# Patient Record
Sex: FEMALE | Race: WHITE | Employment: OTHER | URBAN - METROPOLITAN AREA
[De-identification: names, ages, dates, MRNs, and addresses within clinical notes are randomized per-mention and may not be internally consistent; named-entity substitution may affect disease eponyms.]

---

## 2017-01-27 ENCOUNTER — OFFICE VISIT (OUTPATIENT)
Dept: INFECTIOUS DISEASES | Facility: CLINIC | Age: 57
End: 2017-01-27
Attending: INTERNAL MEDICINE
Payer: COMMERCIAL

## 2017-01-27 ENCOUNTER — DOCUMENTATION ONLY (OUTPATIENT)
Dept: INFECTIOUS DISEASES | Facility: CLINIC | Age: 57
End: 2017-01-27

## 2017-01-27 VITALS
HEART RATE: 119 BPM | WEIGHT: 120.6 LBS | SYSTOLIC BLOOD PRESSURE: 115 MMHG | DIASTOLIC BLOOD PRESSURE: 82 MMHG | OXYGEN SATURATION: 99 % | BODY MASS INDEX: 18.88 KG/M2 | TEMPERATURE: 97.8 F

## 2017-01-27 DIAGNOSIS — F99 PSYCHIATRIC DISORDER: ICD-10-CM

## 2017-01-27 DIAGNOSIS — K92.1 MELENA: Primary | ICD-10-CM

## 2017-01-27 PROCEDURE — 99212 OFFICE O/P EST SF 10 MIN: CPT | Mod: ZF

## 2017-01-27 ASSESSMENT — PAIN SCALES - GENERAL: PAINLEVEL: EXTREME PAIN (9)

## 2017-01-27 NOTE — NURSING NOTE
"Chief Complaint   Patient presents with     RECHECK     follow up for TB of Middle Ear       Initial /82 mmHg  Pulse 119  Temp(Src) 97.8  F (36.6  C) (Oral)  Wt 54.704 kg (120 lb 9.6 oz)  SpO2 99% Estimated body mass index is 18.88 kg/(m^2) as calculated from the following:    Height as of 11/8/16: 1.702 m (5' 7\").    Weight as of this encounter: 54.704 kg (120 lb 9.6 oz).  BP completed using cuff size: rebecca BLANCO CMA    "

## 2017-01-27 NOTE — PROGRESS NOTES
Rapid Response Epic Documentation     Situation: Pt in waiting room experiencing shortness of breath.      Objective: Pt being seen in infectious disease for possible paracite infection.  Pt reports very nervous, concerned about getting the care she wants.  Also reports nausea and dizziness, these are normal symptoms for her.  Denies chest pain or pain elsewhere.      Assessment: Vitals at 1310 /92 P 117-121, O2 sats 99% on RA.  1315 vitals: /100, P 114 O2 sats 100% on 2 LPM oxygen.  1320 vitals: /89, P 113, O2 sats 100% on 2 LPM oxygen.      Plan: After applying oxygen and compassionate listening, pt reported improvement in her symptoms.  Writer asked if pt would like to be evaluated at the ER for her shortness of breath, she declined.      Location: Transferred pt to exam room to await MD arrival.  Pt was able to ambulate independently. Upon arrival to the exam room, pt reported that she hadn't eaten much, she has a poor appetite.  Clinic staff provided Ensure beverage to pt.      Disposition:      Discussed with Dr. Anila Braga, provider seeing pt today.  She felt comfortable with letting the pt stay for the appt.  Pt remained on oxygen when RRT departed. Pt in stable condition.     Protocol Used:     none

## 2017-01-27 NOTE — Clinical Note
1/27/2017       RE: Breana Donahue  [id number] [facility] [id number]     Dear Colleague,    Thank you for referring your patient, Breana Donahue, to the [facility] AND INFECTIOUS DISEASES at [facility]. Please see a copy of my visit note below.    ID Clinic Follow-up Note    Reason for visit: follow up of chronic otitis media that is culture-positive for TIFFANIE    Impression:  1. Self-induced TIFFANIE otitis media (vs. TIFFANIE colonization) that has occurred due to attempted self-remedy of delusional parasitosis.   Fortunately her mucopurulent ear drainage has resolved since her last visit, but it's unclear the extent to which antimicrobials are responsible for this. I actually doubt that she took the clarithro/cipro regimen, so suspect the ear drainage was self-resolving or has been remedied with saline rinses.  Plan:  -no further therapy  -return to ID clinic if drainage returns    2. H pylori gastritis with ulceration and report of ongoing melena. She is refusing despite my encouragement to take 4-drug therapy as directed by GI because she fears worsening parasitosis. I encouraged her to undergo testing including CBC, INR and PTT but she refused.  Plan:  -continue to recommend 3 drug therapy, unfortunately patient is refusin    3. 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 I reiterated that she has no evidence of parasitic disease. She inquired about send-out testing to a [place], [place] lab and I told her the results would not  at this time.  Plan:  -no antiparasitics indicated; she could use psychiatric input but refuses.    Visit length 25min, >50% clinical counseling/care coordination.    Anila Steiner MD   of Medicine, Division of Infectious Diseases  [facility] [phone]      History of Present Illness:  Breana is following up after I saw her last in 10/2016. She has  delusional parasitosis and a history of utilizing vinegar on her corneas (resulting in significant damage) along with placement of organic matter (plantains) in her ears due to her belief that these substances will aid in the eradication of parasites from her body. She is followed by Dr. Hernandez in ENT for several months of R ear drainage that is related to chronic R otitis media. She underwent a partial mastoidectomy. Cultures from this procedure revealed TIFFANIE, though notably pathology was negative for AFB. MRI of the maxillofacial structures in ~9/2016 revealed post-surgical changes of mastoidectomy with no intra-cranial extension. The sensitivities of her TIFFANIE reveal S- to quinolones and clarithromycin    I prescribed oral clarithromycin and otic cipro at her last visit. She initially told me she was using these medications, but later said she had filled the prescriptions but had stopped them months ago. She is doing frequent saline rinses of her bilateral ears.    In the interim, she underwent evaluation for melena and anemia that revealed a bleeding upper GI ulcer that pathologically suggested H pylori infection. She was recommended 3-drug therapy for this but per Dr. Zelaya in GI she refused. She reports ongoing melena that she attributes to parasitosis.    She continues to have sensations of parasites crawling in her body and through her skin and other orifices. She also feels as though parasites are responsible for her ear drainage and for several skin lesions located on her scalp and her arms. She is very firm in this belief and perceives that she poses an infectious risk to others.    Past Medical History   Diagnosis Date     Blind left eye      Delusions of parasitosis      Social History     Social History     Marital Status: Single     Spouse Name: N/A     Number of Children: N/A     Years of Education: N/A     Occupational History     Not on file.     Social History Main Topics     Smoking status:  Current Some Day Smoker -- 0.10 packs/day     Types: Cigarettes     Smokeless tobacco: Never Used      Comment: states no longer smoking 8/16/16     Alcohol Use: No     Drug Use: No     Sexual Activity:     Partners: Male     Other Topics Concern     Parent/Sibling W/ Cabg, Mi Or Angioplasty Before 65f 55m? No     Social History Narrative     Family History   Problem Relation Age of Onset     Alzheimer Disease Mother      Substance Abuse Mother      Alzheimer Disease Father      Substance Abuse Father      Skin Cancer No family hx of      Glaucoma No family hx of      Macular Degeneration No family hx of      Ovarian Cancer Mother      /82 mmHg  Pulse 119  Temp(Src) 97.8  F (36.6  C) (Oral)  Wt 54.704 kg (120 lb 9.6 oz)  SpO2 99%   Notably, while in the WR, she complained of difficulty breathing. Her vitals revealed normal pulse oximetry. Her SOBr resolved during our encounter  Awake, alert, generally pleasant, no distress  Examination of her R ear canal reveals no drainage.  She perseverates about parasitic infestation but is linear in her thinking and does not respond to internal stimuli    Again, thank you for allowing me to participate in the care of your patient.      Sincerely,    Anila Steiner MD

## 2017-12-30 ENCOUNTER — HEALTH MAINTENANCE LETTER (OUTPATIENT)
Age: 57
End: 2017-12-30

## 2018-08-29 ENCOUNTER — RECORDS - HEALTHEAST (OUTPATIENT)
Dept: LAB | Facility: CLINIC | Age: 58
End: 2018-08-29

## 2018-08-31 LAB — B BURGDOR IGG+IGM SER QL: 0.08 INDEX VALUE

## 2018-11-09 ENCOUNTER — TRANSFERRED RECORDS (OUTPATIENT)
Dept: HEALTH INFORMATION MANAGEMENT | Facility: CLINIC | Age: 58
End: 2018-11-09

## 2018-12-13 ENCOUNTER — HOSPITAL ENCOUNTER (OUTPATIENT)
Facility: CLINIC | Age: 58
End: 2018-12-13
Attending: OTOLARYNGOLOGY | Admitting: OTOLARYNGOLOGY
Payer: MEDICAID

## 2019-01-31 RX ORDER — CEFAZOLIN SODIUM 1 G/3ML
1 INJECTION, POWDER, FOR SOLUTION INTRAMUSCULAR; INTRAVENOUS SEE ADMIN INSTRUCTIONS
Status: CANCELLED | OUTPATIENT
Start: 2019-01-31

## 2019-01-31 RX ORDER — CEFAZOLIN SODIUM 2 G/100ML
2 INJECTION, SOLUTION INTRAVENOUS
Status: CANCELLED | OUTPATIENT
Start: 2019-01-31

## 2019-02-03 ENCOUNTER — ANESTHESIA EVENT (OUTPATIENT)
Dept: SURGERY | Facility: CLINIC | Age: 59
End: 2019-02-03

## 2019-02-04 ENCOUNTER — ANESTHESIA (OUTPATIENT)
Dept: SURGERY | Facility: CLINIC | Age: 59
End: 2019-02-04

## 2019-02-15 ENCOUNTER — TRANSFERRED RECORDS (OUTPATIENT)
Dept: HEALTH INFORMATION MANAGEMENT | Facility: CLINIC | Age: 59
End: 2019-02-15

## 2019-03-11 ENCOUNTER — ANESTHESIA (OUTPATIENT)
Dept: SURGERY | Facility: CLINIC | Age: 59
End: 2019-03-11
Payer: MEDICAID

## 2019-03-11 ENCOUNTER — ANESTHESIA EVENT (OUTPATIENT)
Dept: SURGERY | Facility: CLINIC | Age: 59
End: 2019-03-11
Payer: MEDICAID

## 2019-03-11 ENCOUNTER — HOSPITAL ENCOUNTER (OUTPATIENT)
Facility: CLINIC | Age: 59
Discharge: HOME | End: 2019-03-11
Attending: OTOLARYNGOLOGY | Admitting: OTOLARYNGOLOGY
Payer: MEDICAID

## 2019-03-11 VITALS
TEMPERATURE: 98.1 F | RESPIRATION RATE: 12 BRPM | BODY MASS INDEX: 18.67 KG/M2 | HEIGHT: 66 IN | SYSTOLIC BLOOD PRESSURE: 114 MMHG | OXYGEN SATURATION: 94 % | DIASTOLIC BLOOD PRESSURE: 75 MMHG | WEIGHT: 116.18 LBS | HEART RATE: 78 BPM

## 2019-03-11 DIAGNOSIS — H66.92 CHRONIC OTITIS MEDIA OF LEFT EAR: Primary | ICD-10-CM

## 2019-03-11 LAB — GLUCOSE BLDC GLUCOMTR-MCNC: 94 MG/DL (ref 70–99)

## 2019-03-11 PROCEDURE — 37000009 ZZH ANESTHESIA TECHNICAL FEE, EACH ADDTL 15 MIN: Performed by: OTOLARYNGOLOGY

## 2019-03-11 PROCEDURE — 88304 TISSUE EXAM BY PATHOLOGIST: CPT | Mod: 26 | Performed by: OTOLARYNGOLOGY

## 2019-03-11 PROCEDURE — 82962 GLUCOSE BLOOD TEST: CPT

## 2019-03-11 PROCEDURE — 40000170 ZZH STATISTIC PRE-PROCEDURE ASSESSMENT II: Performed by: OTOLARYNGOLOGY

## 2019-03-11 PROCEDURE — 37000008 ZZH ANESTHESIA TECHNICAL FEE, 1ST 30 MIN: Performed by: OTOLARYNGOLOGY

## 2019-03-11 PROCEDURE — 27210794 ZZH OR GENERAL SUPPLY STERILE: Performed by: OTOLARYNGOLOGY

## 2019-03-11 PROCEDURE — 25000132 ZZH RX MED GY IP 250 OP 250 PS 637: Performed by: OTOLARYNGOLOGY

## 2019-03-11 PROCEDURE — 88304 TISSUE EXAM BY PATHOLOGIST: CPT | Performed by: OTOLARYNGOLOGY

## 2019-03-11 PROCEDURE — 25000566 ZZH SEVOFLURANE, EA 15 MIN: Performed by: OTOLARYNGOLOGY

## 2019-03-11 PROCEDURE — 25800030 ZZH RX IP 258 OP 636: Performed by: NURSE ANESTHETIST, CERTIFIED REGISTERED

## 2019-03-11 PROCEDURE — 36000062 ZZH SURGERY LEVEL 4 1ST 30 MIN - UMMC: Performed by: OTOLARYNGOLOGY

## 2019-03-11 PROCEDURE — 25000128 H RX IP 250 OP 636: Performed by: OTOLARYNGOLOGY

## 2019-03-11 PROCEDURE — 71000015 ZZH RECOVERY PHASE 1 LEVEL 2 EA ADDTL HR: Performed by: OTOLARYNGOLOGY

## 2019-03-11 PROCEDURE — 71000014 ZZH RECOVERY PHASE 1 LEVEL 2 FIRST HR: Performed by: OTOLARYNGOLOGY

## 2019-03-11 PROCEDURE — 71000027 ZZH RECOVERY PHASE 2 EACH 15 MINS: Performed by: OTOLARYNGOLOGY

## 2019-03-11 PROCEDURE — 25000128 H RX IP 250 OP 636: Performed by: NURSE ANESTHETIST, CERTIFIED REGISTERED

## 2019-03-11 PROCEDURE — 36000064 ZZH SURGERY LEVEL 4 EA 15 ADDTL MIN - UMMC: Performed by: OTOLARYNGOLOGY

## 2019-03-11 PROCEDURE — 25000128 H RX IP 250 OP 636: Performed by: ANESTHESIOLOGY

## 2019-03-11 PROCEDURE — 25000125 ZZHC RX 250: Performed by: OTOLARYNGOLOGY

## 2019-03-11 PROCEDURE — 25000125 ZZHC RX 250: Performed by: NURSE ANESTHETIST, CERTIFIED REGISTERED

## 2019-03-11 DEVICE — SHEET SILICONE 38MMX51MMX0.13MM  20-10685: Type: IMPLANTABLE DEVICE | Site: EAR | Status: FUNCTIONAL

## 2019-03-11 RX ORDER — SODIUM CHLORIDE, SODIUM LACTATE, POTASSIUM CHLORIDE, CALCIUM CHLORIDE 600; 310; 30; 20 MG/100ML; MG/100ML; MG/100ML; MG/100ML
INJECTION, SOLUTION INTRAVENOUS CONTINUOUS
Status: DISCONTINUED | OUTPATIENT
Start: 2019-03-11 | End: 2019-03-18 | Stop reason: HOSPADM

## 2019-03-11 RX ORDER — LIDOCAINE HYDROCHLORIDE AND EPINEPHRINE 10; 10 MG/ML; UG/ML
INJECTION, SOLUTION INFILTRATION; PERINEURAL PRN
Status: DISCONTINUED | OUTPATIENT
Start: 2019-03-11 | End: 2019-03-18 | Stop reason: HOSPADM

## 2019-03-11 RX ORDER — ONDANSETRON 2 MG/ML
4 INJECTION INTRAMUSCULAR; INTRAVENOUS EVERY 30 MIN PRN
Status: DISCONTINUED | OUTPATIENT
Start: 2019-03-11 | End: 2019-03-18 | Stop reason: HOSPADM

## 2019-03-11 RX ORDER — BACITRACIN ZINC 500 [USP'U]/G
OINTMENT TOPICAL PRN
Status: DISCONTINUED | OUTPATIENT
Start: 2019-03-11 | End: 2019-03-18 | Stop reason: HOSPADM

## 2019-03-11 RX ORDER — OXYCODONE HYDROCHLORIDE 5 MG/1
5 TABLET ORAL EVERY 4 HOURS PRN
Qty: 16 TABLET | Refills: 0 | Status: SHIPPED | OUTPATIENT
Start: 2019-03-11

## 2019-03-11 RX ORDER — FENTANYL CITRATE 50 UG/ML
25-50 INJECTION, SOLUTION INTRAMUSCULAR; INTRAVENOUS
Status: DISCONTINUED | OUTPATIENT
Start: 2019-03-11 | End: 2019-03-18 | Stop reason: HOSPADM

## 2019-03-11 RX ORDER — MEPERIDINE HYDROCHLORIDE 25 MG/ML
12.5 INJECTION INTRAMUSCULAR; INTRAVENOUS; SUBCUTANEOUS
Status: DISCONTINUED | OUTPATIENT
Start: 2019-03-11 | End: 2019-03-18 | Stop reason: HOSPADM

## 2019-03-11 RX ORDER — PROPOFOL 10 MG/ML
INJECTION, EMULSION INTRAVENOUS PRN
Status: DISCONTINUED | OUTPATIENT
Start: 2019-03-11 | End: 2019-03-11

## 2019-03-11 RX ORDER — ONDANSETRON 4 MG/1
4 TABLET, ORALLY DISINTEGRATING ORAL EVERY 30 MIN PRN
Status: DISCONTINUED | OUTPATIENT
Start: 2019-03-11 | End: 2019-03-18 | Stop reason: HOSPADM

## 2019-03-11 RX ORDER — EPINEPHRINE NASAL SOLUTION 1 MG/ML
SOLUTION NASAL PRN
Status: DISCONTINUED | OUTPATIENT
Start: 2019-03-11 | End: 2019-03-18 | Stop reason: HOSPADM

## 2019-03-11 RX ORDER — NALOXONE HYDROCHLORIDE 0.4 MG/ML
.1-.4 INJECTION, SOLUTION INTRAMUSCULAR; INTRAVENOUS; SUBCUTANEOUS
Status: ACTIVE | OUTPATIENT
Start: 2019-03-11 | End: 2019-03-12

## 2019-03-11 RX ORDER — OXYCODONE HYDROCHLORIDE 5 MG/1
5 TABLET ORAL
Status: COMPLETED | OUTPATIENT
Start: 2019-03-11 | End: 2019-03-11

## 2019-03-11 RX ORDER — HYDROMORPHONE HYDROCHLORIDE 1 MG/ML
.3-.5 INJECTION, SOLUTION INTRAMUSCULAR; INTRAVENOUS; SUBCUTANEOUS EVERY 10 MIN PRN
Status: DISCONTINUED | OUTPATIENT
Start: 2019-03-11 | End: 2019-03-18 | Stop reason: HOSPADM

## 2019-03-11 RX ORDER — AMOXICILLIN 500 MG/1
500 CAPSULE ORAL 2 TIMES DAILY
Qty: 20 CAPSULE | Refills: 0 | Status: SHIPPED | OUTPATIENT
Start: 2019-03-11 | End: 2019-03-21

## 2019-03-11 RX ORDER — CEFAZOLIN SODIUM 1 G/3ML
1 INJECTION, POWDER, FOR SOLUTION INTRAMUSCULAR; INTRAVENOUS SEE ADMIN INSTRUCTIONS
Status: DISCONTINUED | OUTPATIENT
Start: 2019-03-11 | End: 2019-03-11 | Stop reason: HOSPADM

## 2019-03-11 RX ORDER — ONDANSETRON 2 MG/ML
INJECTION INTRAMUSCULAR; INTRAVENOUS PRN
Status: DISCONTINUED | OUTPATIENT
Start: 2019-03-11 | End: 2019-03-11

## 2019-03-11 RX ORDER — HYDRALAZINE HYDROCHLORIDE 20 MG/ML
2.5-5 INJECTION INTRAMUSCULAR; INTRAVENOUS EVERY 10 MIN PRN
Status: DISCONTINUED | OUTPATIENT
Start: 2019-03-11 | End: 2019-03-18 | Stop reason: HOSPADM

## 2019-03-11 RX ORDER — SULFACETAMIDE SODIUM AND PREDNISOLONE SODIUM PHOSPHATE 100; 2.3 MG/ML; MG/ML
SOLUTION/ DROPS OPHTHALMIC PRN
Status: DISCONTINUED | OUTPATIENT
Start: 2019-03-11 | End: 2019-03-18 | Stop reason: HOSPADM

## 2019-03-11 RX ORDER — LIDOCAINE HYDROCHLORIDE 20 MG/ML
INJECTION, SOLUTION INFILTRATION; PERINEURAL PRN
Status: DISCONTINUED | OUTPATIENT
Start: 2019-03-11 | End: 2019-03-11

## 2019-03-11 RX ORDER — SODIUM CHLORIDE, SODIUM LACTATE, POTASSIUM CHLORIDE, CALCIUM CHLORIDE 600; 310; 30; 20 MG/100ML; MG/100ML; MG/100ML; MG/100ML
INJECTION, SOLUTION INTRAVENOUS CONTINUOUS PRN
Status: DISCONTINUED | OUTPATIENT
Start: 2019-03-11 | End: 2019-03-11

## 2019-03-11 RX ORDER — FENTANYL CITRATE 50 UG/ML
INJECTION, SOLUTION INTRAMUSCULAR; INTRAVENOUS PRN
Status: DISCONTINUED | OUTPATIENT
Start: 2019-03-11 | End: 2019-03-11

## 2019-03-11 RX ORDER — CEFAZOLIN SODIUM 2 G/100ML
2 INJECTION, SOLUTION INTRAVENOUS
Status: COMPLETED | OUTPATIENT
Start: 2019-03-11 | End: 2019-03-11

## 2019-03-11 RX ORDER — DEXAMETHASONE SODIUM PHOSPHATE 4 MG/ML
INJECTION, SOLUTION INTRA-ARTICULAR; INTRALESIONAL; INTRAMUSCULAR; INTRAVENOUS; SOFT TISSUE PRN
Status: DISCONTINUED | OUTPATIENT
Start: 2019-03-11 | End: 2019-03-11

## 2019-03-11 RX ADMIN — SODIUM CHLORIDE, POTASSIUM CHLORIDE, SODIUM LACTATE AND CALCIUM CHLORIDE: 600; 310; 30; 20 INJECTION, SOLUTION INTRAVENOUS at 08:19

## 2019-03-11 RX ADMIN — PROPOFOL 110 MG: 10 INJECTION, EMULSION INTRAVENOUS at 08:28

## 2019-03-11 RX ADMIN — ONDANSETRON 4 MG: 2 INJECTION INTRAMUSCULAR; INTRAVENOUS at 13:48

## 2019-03-11 RX ADMIN — SODIUM CHLORIDE, POTASSIUM CHLORIDE, SODIUM LACTATE AND CALCIUM CHLORIDE: 600; 310; 30; 20 INJECTION, SOLUTION INTRAVENOUS at 10:35

## 2019-03-11 RX ADMIN — PHENYLEPHRINE HYDROCHLORIDE 100 MCG: 10 INJECTION, SOLUTION INTRAMUSCULAR; INTRAVENOUS; SUBCUTANEOUS at 08:46

## 2019-03-11 RX ADMIN — HYDROMORPHONE HYDROCHLORIDE 0.5 MG: 1 INJECTION, SOLUTION INTRAMUSCULAR; INTRAVENOUS; SUBCUTANEOUS at 09:13

## 2019-03-11 RX ADMIN — CEFAZOLIN SODIUM 2 G: 2 INJECTION, SOLUTION INTRAVENOUS at 08:37

## 2019-03-11 RX ADMIN — PHENYLEPHRINE HYDROCHLORIDE 100 MCG: 10 INJECTION, SOLUTION INTRAMUSCULAR; INTRAVENOUS; SUBCUTANEOUS at 09:22

## 2019-03-11 RX ADMIN — CEFAZOLIN SODIUM 1 G: 2 INJECTION, SOLUTION INTRAVENOUS at 10:35

## 2019-03-11 RX ADMIN — MIDAZOLAM 2 MG: 1 INJECTION INTRAMUSCULAR; INTRAVENOUS at 08:19

## 2019-03-11 RX ADMIN — OXYCODONE HYDROCHLORIDE 5 MG: 5 TABLET ORAL at 16:04

## 2019-03-11 RX ADMIN — PROCHLORPERAZINE EDISYLATE 5 MG: 5 INJECTION INTRAMUSCULAR; INTRAVENOUS at 15:50

## 2019-03-11 RX ADMIN — Medication 0.3 MG: at 13:57

## 2019-03-11 RX ADMIN — ONDANSETRON 4 MG: 2 INJECTION INTRAMUSCULAR; INTRAVENOUS at 11:09

## 2019-03-11 RX ADMIN — PHENYLEPHRINE HYDROCHLORIDE 0.3 MCG/KG/MIN: 10 INJECTION, SOLUTION INTRAMUSCULAR; INTRAVENOUS; SUBCUTANEOUS at 09:26

## 2019-03-11 RX ADMIN — PHENYLEPHRINE HYDROCHLORIDE 100 MCG: 10 INJECTION, SOLUTION INTRAMUSCULAR; INTRAVENOUS; SUBCUTANEOUS at 08:41

## 2019-03-11 RX ADMIN — SUGAMMADEX 110 MG: 100 INJECTION, SOLUTION INTRAVENOUS at 11:43

## 2019-03-11 RX ADMIN — ROCURONIUM BROMIDE 20 MG: 10 INJECTION INTRAVENOUS at 08:37

## 2019-03-11 RX ADMIN — FENTANYL CITRATE 12.5 MCG: 50 INJECTION, SOLUTION INTRAMUSCULAR; INTRAVENOUS at 12:43

## 2019-03-11 RX ADMIN — HYDROMORPHONE HYDROCHLORIDE 0.5 MG: 1 INJECTION, SOLUTION INTRAMUSCULAR; INTRAVENOUS; SUBCUTANEOUS at 10:09

## 2019-03-11 RX ADMIN — LIDOCAINE HYDROCHLORIDE 60 MG: 20 INJECTION, SOLUTION INFILTRATION; PERINEURAL at 08:28

## 2019-03-11 RX ADMIN — PHENYLEPHRINE HYDROCHLORIDE 100 MCG: 10 INJECTION, SOLUTION INTRAMUSCULAR; INTRAVENOUS; SUBCUTANEOUS at 08:36

## 2019-03-11 RX ADMIN — ROCURONIUM BROMIDE 30 MG: 10 INJECTION INTRAVENOUS at 08:28

## 2019-03-11 RX ADMIN — FENTANYL CITRATE 100 MCG: 50 INJECTION, SOLUTION INTRAMUSCULAR; INTRAVENOUS at 08:28

## 2019-03-11 RX ADMIN — DEXAMETHASONE SODIUM PHOSPHATE 8 MG: 4 INJECTION, SOLUTION INTRAMUSCULAR; INTRAVENOUS at 08:45

## 2019-03-11 RX ADMIN — PHENYLEPHRINE HYDROCHLORIDE 100 MCG: 10 INJECTION, SOLUTION INTRAMUSCULAR; INTRAVENOUS; SUBCUTANEOUS at 08:55

## 2019-03-11 ASSESSMENT — MIFFLIN-ST. JEOR: SCORE: 1108.75

## 2019-03-11 ASSESSMENT — LIFESTYLE VARIABLES: TOBACCO_USE: 1

## 2019-03-11 NOTE — ANESTHESIA POSTPROCEDURE EVALUATION
Anesthesia POST Procedure Evaluation    Patient: Breana Donahue   MRN:     [id number] Gender:   female   Age:    59 year old :      1960        Preoperative Diagnosis: Mixed Conductive Hearing Loss Bilateral   Procedure(s):  Left Mini Endaural Tympanoplasty, Middle Ear Reconstruction, Round Window Graft, Right Myringotomy #2-2000 Tube  Myringotomy, insert tube, combined   Postop Comments: No value filed.       Anesthesia Type:  General    Reportable Event: NO     PAIN: Uncomplicated   Sign Out status: Comfortable, Well controlled pain     PONV: No PONV   Sign Out status:  No Nausea or Vomiting     Neuro/Psych: Uneventful perioperative course   Sign Out Status: Preoperative baseline; Age appropriate mentation     Airway/Resp.: Uneventful perioperative course   Sign Out Status: Non labored breathing, age appropriate RR; Resp. Status within EXPECTED Parameters     CV: Uneventful perioperative course   Sign Out status: Appropriate BP and perfusion indices; Appropriate HR/Rhythm     Disposition:   Sign Out in:  PACU  Disposition:  Phase II; Home  Recovery Course: Uneventful  Follow-Up: Not required           Last Anesthesia Record Vitals:  CRNA VITALS  3/11/2019 1127 - 3/11/2019 1227      3/11/2019             Pulse:  102    Ht Rate:  102    SpO2:  100 %    Resp Rate (observed):  3  (Abnormal)           Last PACU/Preop Vitals:  Vitals:    19 1300 19 1315 19 1321   BP: 114/63  108/65   Pulse: 82     Resp: 11 (!) 6 8   Temp:   37.1  C (98.7  F)   SpO2: 97% 97% 97%         Electronically Signed By: Christopher Minor MD, 2019, 1:30 PM

## 2019-03-11 NOTE — DISCHARGE INSTRUCTIONS
Same-Day Surgery   Adult Discharge Orders & Instructions     For 24 hours after surgery:  1. Get plenty of rest.  A responsible adult must stay with you for at least 24 hours after you leave the hospital.   2. Pain medication can slow your reflexes. Do not drive or use heavy equipment.  If you have weakness or tingling, don't drive or use heavy equipment until this feeling goes away.  3. Mixing alcohol and pain medication can cause dizziness and slow your breathing. It can even be fatal. Do not drink alcohol while taking pain medication.  4. Avoid strenuous or risky activities.  Ask for help when climbing stairs.   5. You may feel lightheaded.  If so, sit for a few minutes before standing.  Have someone help you get up.   6. If you have nausea (feel sick to your stomach), drink only clear liquids such as apple juice, ginger ale, broth or 7-Up.  Rest may also help.  Be sure to drink enough fluids.  Move to a regular diet as you feel able. Take pain medications with a small amount of solid food, such as toast or crackers, to avoid nausea.   7. A slight fever is normal. Call the doctor if your fever is over 100 F (37.7 C) (taken under the tongue) or lasts longer than 24 hours.  8. You may have a dry mouth, muscle aches, trouble sleeping or a sore throat.  These symptoms should go away after 24 hours.  9. Do not make important or legal decisions.   Pain Management:      1. Take pain medication (if prescribed) for pain as directed by your physician.        2. WARNING: If the pain medication you have been prescribed contains Tylenol  (acetaminophen), DO NOT take additional doses of Tylenol (acetaminophen).     Call your doctor for any of the followin.  Signs of infection (fever, growing tenderness at the surgery site, severe pain, a large amount of drainage or bleeding, foul-smelling drainage, redness, swelling).    2.  It has been over 8 to 10 hours since surgery and you are still not able to urinate (pee).    3.   Headache for over 24 hours.    4.  Numbness, tingling or weakness the day after surgery (if you had spinal anesthesia).  To contact a doctor, call _____________________________________ or:      [phone] and ask for the Resident On Call for:          __________________________________________ (answered 24 hours a day)      Emergency Department:  [place] [facility]: [phone]  [place] [facility]: [phone]               Rev. 10/2014        INFORMATION FOR PATIENTS WHO HAVE HAD EAR SURGERY  DO NOT ALLOW WATER OR MOISTURE IN OR AROUND THE EAR CANAL    OR INCISION  DO NOT WASH YOUR HAIR UNTIL AFTER YOUR FIRST VISIT FOLLOWING SURGERY unless your doctor gives you approval. Precautions must be taken to avoid any kind of water or moisture in your ear incision.   DO NOT BLOW YOUR NOSE. Avoid sneezing, if unavoidable, sneeze with your  mouth open.  SLEEP WITH YOUR HEAD PROPPED UP on two pillows for the first few nights after surgery or until the sutures are removed. This will help reduce swelling and promote drainage. Good sleep also promotes rapid healing.  Firm pillows give the best comfort for sleeping. Some find non-rocking, overstuffed chairs provide comfort. As long as your head is above your feet, a comfortable chair can be used for sleeping.  ACTIVITY:     DO NOT LIFT ANYTHING heavier than 5-10 pounds for 1 week.     Then you may increase on a weekly basis. Example: 2nd week - 10-20 pounds; 3rd week - 20-30 pounds; 4th week - usual normal activity. STOP physical exercises such as aerobics, push-ups, sit-ups, etc, depending on the nature of the surgery performed.      Usually normal activity can be resumed after 1 month.   DO NOT STRAIN. If constipation is a problem, use a laxative.   AVOID QUICK HEAD AND BODY MOVEMENTS, THEY CAN CAUSE DIZZINESS. Some imbalance after surgery is normal, however spinning dizziness (vertigo) should be reported.   IF THE EAR IS PACKED, DO NOT REMOVE THE  PACKING   CHANGE THE DRESSING OVER THE INCISION at least twice a day. Make sure you have CLEAN, DRY HANDS when changing the dressing (you will be given supplies including dressings and tape).     Use a cotton-tipped applicator to clean the incision, first with hydrogen peroxide followed by 70% alcohol solution.     Apply a thin layer of Bacitracin ointment twice daily.     Cover with the appropriate dressing. A nurse or physician will advise you on the proper dressing.   LOOK FOR SIGNS OF INFECTION if you have an incision, inspect it daily. Report increased pain, redness, swelling, or drainage to your doctor.   SOME DRAINAGE FROM YOUR EAR IS NORMAL AFTER SURGERY.  You will probably hear unusual sounds until total hearing is complete, generally 4-6 weeks after surgery.    AIRPLANE TRAVEL IS USUALLY RESTRICTED FOR 1 MONTH. Discuss  flying plans with your doctor to discuss measures to protect your ears.    CALL YOUR DOCTOR IF:     Temperature rises to 101 degrees or greater     Ear drainage increases rather than decreases     If ear drainage develops an odor  FOLLOW UP APPOINTMENT: Unless otherwise instructed by your doctor, return to the clinic in 1 week for packing and/or suture removal. You should be given an appointment when you leave the hospital after surgery, If not, please call for an appointment at [phone]. Depending on the nature of the ear procedure, post-operative appointments may be scheduled every 2-3 weeks after surgery to insure proper healing. Our staff doctors are assisted by two associate doctors to promote quality care.    GENERAL LONG TERM INFORMATION  o Do not fly if you have an upper respiratory infection.  o Patients who have had mastoidectomy or canalplasty will need appointments every 6-12 months for ear cleanings for the remainder of their lives as their ears are no longer self cleaning as normal ears usually are.   o All patients who have had an ear surgery should come for  appointments at least once yearly.       IF YOU HAVE QUESTIONS OR CONCERNS, PLEASE CALL OUR OFFICE  AT [phone] (24 hours/day, 7 days/week)             Rev. 5/2014

## 2019-03-11 NOTE — ANESTHESIA PREPROCEDURE EVALUATION
Anesthesia Pre-Procedure Evaluation    Patient: Breana Donahue   MRN:     [id number] Gender:   female   Age:    59 year old :      1960        Preoperative Diagnosis: Mixed Conductive Hearing Loss Bilateral   Procedure(s):  Left Mini Endaural Tympanoplasty, Middle Ear Reconstruction, Round Window Graft, Myringotomy and #2-2000 Tube, Right #2 Or 2-2000 Tube  Possible Ossiculoplasty     Past Medical History:   Diagnosis Date     Blind left eye      Delusions of parasitosis (H)       Past Surgical History:   Procedure Laterality Date     AS EXCIS STOMACH ULCER,LESN;LOCAL       CANALPLASTY Right 2016    Procedure: CANALPLASTY;  Surgeon: Edward Hernandez MD;  Location: [facility]     COLONOSCOPY N/A 12/3/2014    Procedure: COLONOSCOPY;  Surgeon: Bowen Luong MD;  Location: [facility]     COLONOSCOPY N/A 2014    Procedure: COLONOSCOPY;  Surgeon: Iza Clayton MD;  Location: [facility]     ESOPHAGOSCOPY, GASTROSCOPY, DUODENOSCOPY (EGD), COMBINED Left 2016    Procedure: COMBINED ESOPHAGOSCOPY, GASTROSCOPY, DUODENOSCOPY (EGD), BIOPSY SINGLE OR MULTIPLE;  Surgeon: Jeff Zelaya MD;  [facility]: [facility]     GRAFT THIERSCH STATUS POST TYMPANOMASTOIDECTOMY Right 2016    Procedure: GRAFT THIERSCH STATUS POST TYMPANOMASTOIDECTOMY;  Surgeon: Edward Hernandez MD;  Location: [facility]     TYMPANOMASTOIDECTOMY Right 2016    Procedure: TYMPANOMASTOIDECTOMY;  Surgeon: Edward Hernandez MD;  Location: [facility]          Anesthesia Evaluation     .             ROS/MED HX    ENT/Pulmonary:     (+)tobacco use, Current use , . .    Neurologic: Comment: Parasitic Delusions      Cardiovascular:  - neg cardiovascular ROS       METS/Exercise Tolerance:     Hematologic:  - neg hematologic  ROS       Musculoskeletal: Comment: Eczema - neg musculoskeletal ROS       GI/Hepatic:  - neg GI/hepatic ROS       Renal/Genitourinary:  - ROS Renal section negative       Endo:  - neg endo ROS       Psychiatric:  "Comment: Delusions    (+) psychiatric history other (comment)      Infectious Disease:  - neg infectious disease ROS       Malignancy:      - no malignancy   Other:    (+) No chance of pregnancy C-spine cleared: N/A, no H/O Chronic Pain,no other significant disability                        PHYSICAL EXAM:   Mental Status/Neuro: A/A/O   Airway: Facies: Feasible  Mallampati: I  Mouth/Opening: Full  TM distance: > 6 cm  Neck ROM: Full   Respiratory: Auscultation: CTAB     Resp. Rate: Normal     Resp. Effort: Normal      CV: Rhythm: Regular  Heart: Normal Sounds  Pulses: Normal   Comments:      Dental:  Dentures: Upper                  Lab Results   Component Value Date    WBC 7.1 09/09/2016    HGB 10.6 (L) 09/09/2016    HCT 34.1 (L) 09/09/2016     09/09/2016    CRP <2.9 09/09/2016    SED 39 (H) 09/09/2016     09/09/2016    POTASSIUM 4.4 09/09/2016    CHLORIDE 106 09/09/2016    CO2 28 09/09/2016    BUN 11 09/09/2016    CR 0.78 09/09/2016    GLC 93 09/09/2016    PASCUAL 9.8 09/09/2016    ALBUMIN 3.9 09/09/2016    PROTTOTAL 7.8 09/09/2016    ALT 17 09/09/2016    AST 16 09/09/2016    ALKPHOS 95 09/09/2016    BILITOTAL 0.3 09/09/2016    PTT 30 08/11/2016    INR 1.18 (H) 08/11/2016    FIBR 318 08/11/2016    HCG Negative 08/18/2016       Preop Vitals  BP Readings from Last 3 Encounters:   03/11/19 102/74   01/27/17 115/82   11/08/16 110/73    Pulse Readings from Last 3 Encounters:   01/27/17 119   10/07/16 109   09/09/16 73      Resp Readings from Last 3 Encounters:   03/11/19 16   11/08/16 12   08/25/16 12    SpO2 Readings from Last 3 Encounters:   03/11/19 96%   01/27/17 99%   11/08/16 97%      Temp Readings from Last 1 Encounters:   03/11/19 36.9  C (98.4  F) (Oral)    Ht Readings from Last 1 Encounters:   03/11/19 1.676 m (5' 6\")      Wt Readings from Last 1 Encounters:   03/11/19 52.7 kg (116 lb 2.9 oz)    Estimated body mass index is 18.75 kg/m  as calculated from the following:    Height as of this encounter: " "1.676 m (5' 6\").    Weight as of this encounter: 52.7 kg (116 lb 2.9 oz).     LDA:  Peripheral IV 08/25/16 Left Hand (Active)   Number of days: 928       Peripheral IV 03/11/19 Right Hand (Active)   Site Assessment WDL 3/11/2019  7:14 AM   Line Status Saline locked 3/11/2019  7:14 AM   Phlebitis Scale 0-->no symptoms 3/11/2019  7:14 AM   Infiltration Scale 0 3/11/2019  7:14 AM   Number of days: 0            Assessment:   ASA SCORE: 2    NPO Status: > 2 hours since completed Clear Liquids; > 6 hours since completed Solid Foods   Documentation: H&P complete; Preop Testing complete; Consents complete   Proceeding: Proceed without further delay  Tobacco Use:  NO Active use of Tobacco/UNKNOWN Tobacco use status     Plan:   Anes. Type:  General   Pre-Induction: Midazolam IV; Acetaminophen PO   Induction:  IV (Standard)   Airway: Oral ETT   Access/Monitoring: PIV   Maintenance: Balanced   Emergence: Procedure Site   Logistics: Same Day Surgery     Postop Pain/Sedation Strategy:  Standard-Options: Opioids PRN     PONV Management:  Adult Risk Factors: Female, Non-Smoker, Postop Opioids  Prevention: Ondansetron; Dexamethasone     CONSENT: Direct conversation                               Christopher Minor MD  "

## 2019-03-11 NOTE — ANESTHESIA CARE TRANSFER NOTE
Patient: Breana Donahue    Procedure(s):  Left Mini Endaural Tympanoplasty, Middle Ear Reconstruction, Round Window Graft, Right Myringotomy #2-2000 Tube  Myringotomy, insert tube, combined    Diagnosis: Mixed Conductive Hearing Loss Bilateral  Diagnosis Additional Information: No value filed.    Anesthesia Type:   No value filed.     Note:  Airway :Face Mask  Patient transferred to:PACU  Comments: Strong SV, VSS. Report to RN.  Handoff Report: Identifed the Patient, Identified the Reponsible Provider, Reviewed the pertinent medical history, Discussed the surgical course, Reviewed Intra-OP anesthesia mangement and issues during anesthesia, Set expectations for post-procedure period and Allowed opportunity for questions and acknowledgement of understanding      Vitals: (Last set prior to Anesthesia Care Transfer)    CRNA VITALS  3/11/2019 1127 - 3/11/2019 1201      3/11/2019             Pulse:  102    Ht Rate:  102    SpO2:  100 %    Resp Rate (observed):  3  (Abnormal)                 Electronically Signed By: SAHARA Lemus CRNA  March 11, 2019  12:01 PM

## 2019-03-11 NOTE — BRIEF OP NOTE
[facility], [place]    Brief Operative Note    Pre-operative diagnosis: Mixed Conductive Hearing Loss Bilateral  Post-operative diagnosis Mixed Conductive Hearing Loss Bilateral, left external auditory canal stenosis, tympanic membrane perforation, left chronic otitis media.  Procedure: Procedure(s):  Left Endaural type II double layer fascial Tympanoplasty, removal of copious granulation tissue, Middle Ear Reconstruction, Round Window Graft, canaloplasty, meatoplasty, atticotomy  Right Myringotomy #2-2000 Tube  Surgeon: Surgeon(s) and Role:     * Edward Hernadnez MD - Primary     * Shasha Washington DO - Assisting  Anesthesia: General   Estimated blood loss: Less than 10 ml  Drains: None  Specimens:   ID Type Source Tests Collected by Time Destination   A : left middle ear contents Tissue Ear, Middle, Left SURGICAL PATHOLOGY EXAM Shasha Washington DO 3/11/2019 10:20 AM      Findings:     1. Thickened external audiotory canal skin  2. Severe stenosis of external auditory canal  3. Chorda tympani intact, facial nerve dehiscent in tympanic segment.  4. Subtotal tympanic membrane intact  5. Ossicular chain intact  6. Copious granulation tissue completely encompassing the middle ear and extending into the aditus  7. No cholesteatoma noted  8. Double layer fascial graft  9. Very thick middle ear fluid aspirated  Complications: None.  Implants: None.

## 2019-03-12 LAB — COPATH REPORT: NORMAL

## 2019-03-12 NOTE — OP NOTE
Procedure Date: 03/11/2019      PREOPERATIVE DIAGNOSIS:  Mixed conductive and sensorineural hearing loss, bilateral.      POSTOPERATIVE DIAGNOSES:     1.  Mixed conductive and sensorineural hearing loss, bilateral.     2.  Left external auditory canal stenosis.   3.  Left tympanic membrane perforation.     4.  Left chronic otitis media.      PROCEDURES:   1.  Left endaural type 2 double layer fascial tympanoplasty.   2.  Left removal of copious granulation tissue.   3.  Left middle ear reconstruction.   4.  Left round window graft.   5.  Left canaloplasty.   6.  Left meatoplasty.   7.  Left atticotomy.   8.  Right myringotomy and #2-2000 Paparella tube.        SURGEON:  Edward Hernandez MD      ASSISTANT:  Shasha Washington DO      ANESTHESIA:  General endotracheal intubation.      ESTIMATED BLOOD LOSS:  Less than 10 mL.      COMPLICATIONS:  None.      SPECIMENS:  Left middle ear contents.      FINDINGS:   1.  Extremely thickened external auditory canal skin.   2.  Severe stenosis of the external auditory canal.   3.  Chorda tympani intact, facial nerve dehiscent in the tympanic segment.   4.  Subtotal tympanic membrane perforation.   5.  Ossicular chain intact.   6.  Copious granulation tissue completely encompassing the middle ear and extending into the aditus ad antrum.   7.  No gross cholesteatoma noted, possible squamous tracking on undersurface of tympanic membrane.    8.  Double layer fascial graft.      IMPLANTS:  None.      INDICATIONS FOR PROCEDURE:  The patient is a 59-year-old female who presented to the office for left ear pain and sensation that there were, and in fact belief that there were bugs in her ear and various protozoa that she states had been coming out of her ear.  She had an audiogram demonstrating mixed conductive and sensorineural hearing loss on the left.  For these indications, the above-mentioned procedures were indicated.  The risks versus benefits and alternatives of the procedure  were discussed with the patient including bleeding, infection, need for revision procedure, decrease in hearing, injury to the facial nerve, change in taste, vertigo, dizziness, and injury to the skull base resulting in CSF leak.  The patient understood the risks and elected to pursue surgical management.      DESCRIPTION OF PROCEDURE:  After appropriate informed consent was signed and placed on the chart, the patient was taken to the operative suite, placed in supine position, and endotracheally intubated.  The bed was rotated 90 degrees and the patient was prepped and draped in a sterile fashion with Betadine solution.  The operative microscope was also sterilely draped.  A timeout was performed to identify the correct patient, procedure, and laterality.        The patient's left ear was then injected with 1% lidocaine with epinephrine.  A #15 blade scalpel was used to perform a tympanomeatal flap and elevate the flap anteriorly with a duckbill elevator.  Severe stenosis of the canal was noted and a canaloplasty was performed with #6 and #4 cutting burs.  This resulted in enlargement of the external auditory canal.  The sickle knife was used to enter the middle ear space and elevate the drum anteriorly.  There was copious granulation tissue and a very large marginal perforation noted.  There was copious granulation tissue in the oval window niche and the encompassing the ossicular chain, which was carefully removed with a cup forceps.  The ossicular chain appeared to be intact.  A mastoid curet was used to enlarge the ear canal at the tympanic annulus.  The chorda tympani was intact.  The facial nerve appeared to be dehiscent in the tympanic segment and this was carefully avoided throughout the duration of the case.  The margins of the tympanic membrane perforation were investigated to ensure there was no cholesteatoma.  The tympanic membrane was elevated anteriorly off of the malleus to look at the protympanum  and this just appeared to have granulation tissue.  There was no overt cholesteatoma noted.  Specimens were obtained from the middle ear and sent for permanent pathology.  Bleeding was controlled with adrenaline-soaked cotton balls, which were then removed.  The round window graft was then performed with Gelfoam and a piece of Silastic was placed along the promontory for a middle ear reconstruction.  A double layer fascial graft was then placed in the underlay and overlay fashion around the margins of the perforation.  Gelfilm was used to assist in the buttressing of the graft around the ossicular chain.  The remaining skin was then replaced in the external auditory canal.  The yessenia packing was then placed along the reconstructed eardrum.  The remainder of the ear canal was then packed with Vaseline impregnated gauze.  The meatoplasty was performed by a loose closure of the external auditory canal with a 3-0 Vicryl suture and a 4-0 nylon suture.  A sterile dressing was applied.        Of note, additional finding was very thick fluid throughout the middle ear and coming from the aditus ad antrum as well which was aspirated during the case.  She may require a future mastoidectomy or tympanostomy tube placement in the office due to this finding.  However, her perforation was quite large and this was not able to be performed during surgery today.        The patient was returned to Anesthesia for uncomplicated emergence.  The patient tolerated the procedure well and there were no complications.  Dr. Hernandez was present and performed the key portions of the procedure.         ISELA HERNANDEZ MD       As dictated by HA MOE DO            D: 2019   T: 2019   MT: BRE      Name:     EPI ADAN   MRN:      [id number]        Account:        [id number]   :      1960           Procedure Date: 2019      Document: [id number]

## 2019-03-28 ENCOUNTER — RECORDS - HEALTHEAST (OUTPATIENT)
Dept: LAB | Facility: CLINIC | Age: 59
End: 2019-03-28

## 2019-03-29 LAB — 25(OH)D3 SERPL-MCNC: 19 NG/ML (ref 30–80)

## 2019-06-06 ENCOUNTER — RECORDS - HEALTHEAST (OUTPATIENT)
Dept: LAB | Facility: CLINIC | Age: 59
End: 2019-06-06

## 2019-06-08 ENCOUNTER — RECORDS - HEALTHEAST (OUTPATIENT)
Dept: LAB | Facility: CLINIC | Age: 59
End: 2019-06-08

## 2019-06-08 LAB
SHIGA TOXIN 1: NEGATIVE
SHIGA TOXIN 2: NEGATIVE

## 2019-06-10 LAB
BACTERIA SPEC CULT: NORMAL
O+P STL MICRO: NORMAL

## 2020-09-09 ENCOUNTER — RECORDS - HEALTHEAST (OUTPATIENT)
Dept: LAB | Facility: CLINIC | Age: 60
End: 2020-09-09

## 2020-09-10 LAB — VIT B12 SERPL-MCNC: 592 PG/ML (ref 213–816)

## 2020-09-11 LAB — 25(OH)D3 SERPL-MCNC: 23.1 NG/ML (ref 30–80)

## 2021-05-29 ENCOUNTER — RECORDS - HEALTHEAST (OUTPATIENT)
Dept: ADMINISTRATIVE | Facility: CLINIC | Age: 61
End: 2021-05-29

## 2024-03-26 ENCOUNTER — MEDICAL CORRESPONDENCE (OUTPATIENT)
Dept: HEALTH INFORMATION MANAGEMENT | Facility: CLINIC | Age: 64
End: 2024-03-26
Payer: MEDICAID

## 2024-03-27 ENCOUNTER — TRANSCRIBE ORDERS (OUTPATIENT)
Dept: OTHER | Age: 64
End: 2024-03-27

## 2024-03-27 DIAGNOSIS — H91.93 HEARING LOSS, BILATERAL: Primary | ICD-10-CM

## 2024-03-28 ENCOUNTER — TELEPHONE (OUTPATIENT)
Dept: OTOLARYNGOLOGY | Facility: CLINIC | Age: 64
End: 2024-03-28
Payer: MEDICAID

## 2024-03-28 NOTE — TELEPHONE ENCOUNTER
[facility] Call Center    Phone Message    May a detailed message be left on voicemail: yes     Reason for Call: Symptoms or Concerns     If patient has red-flag symptoms, warm transfer to triage line    Current symptom or concern: Patient is scheduled for June with Dr Michaud, she is wondering if the MRI from 2016 to see if there is anything in there that will help with the upcoming appointment.    Thank you        Action Taken: Other: ENT    Travel Screening: Not Applicable

## 2024-03-29 NOTE — TELEPHONE ENCOUNTER
FUTURE VISIT INFORMATION      FUTURE VISIT INFORMATION:  Date: 6/11/24  Time: 9:10 AM  Location: [facility]  REFERRAL INFORMATION:  Referring provider:  Jorge Larson M.D   Referring providers clinic:  [facility]  Reason for visit/diagnosis:  Per Pt, dx hearing loss referral from   Jorge Larson M.D. @ [facility] recs in [facility]     RECORDS REQUESTED FROM      Clinic name Comments Records Status Imaging Status   [facility] 3/26/24 telephone encounter- Maikel Blair M.D.    11/28/23 note- Vicky Laughlin M.D  11/28/23 note- Jovanna Calderon Au.D   11/28/23 audiogram    8/21/23 note- Gordo Candelaria PPadminiA.-[facility] [place][note 2] ENT   3/2/24 note- Romeo Escobar MD   CE    [facility] 10/12/15 CT head brain    2/9/15 note- Isaiah Rodríguez MD  CE Pending req   [facility] 9/22/16 MR orbit face neck [facility] P[facility]   [facility] 5/2016- 2/15/19 audiograms  [id number] note    Procedure  3/11/19 Left Mini Endaural Tympanoplasty, Middle Ear Reconstruction, Round Window Graft, Right Myringotomy #2-2000 Tube     8/25/16 Right Removal Granulation Tissue, Removal of Packing and Sutures, Thiersch Graft     8/18/16 Right Endaural Tympanomastoidectomy Flexible Approach, Middle Ear Reconstruction, Round Window Graft, Meatoplasty, Canalplasty  [facility]- [facility] 4/1/2016 note- Claudio garcia MD  Epic

## 2024-04-01 ENCOUNTER — PATIENT OUTREACH (OUTPATIENT)
Dept: OTOLARYNGOLOGY | Facility: CLINIC | Age: 64
End: 2024-04-01
Payer: MEDICAID

## 2024-04-01 NOTE — TELEPHONE ENCOUNTER
Called and spoke with patient with update that the appointment scheduled on 6/17 with audiology and Dr. Michaud will be canceled. Advised that patient's records were reviewed and there is nothing additional that our team would have to offer. Advised patient that she should return to her primary care provider or referring provider at [place] to further discuss other options.     Patient proceeded to yell at writer on phone. Writer discussed that we would not want her to complete an unnecessary appointment here given that we have nothing additional to offer for her care. Patient proceeded to hang up on writer.       All appointments canceled.       Missy Lees, RN, BSN

## 2024-06-11 ENCOUNTER — PRE VISIT (OUTPATIENT)
Dept: OTOLARYNGOLOGY | Facility: CLINIC | Age: 64
End: 2024-06-11

## (undated) DEVICE — DRAPE MICRO ZEISS MD 62"X40.5" OPMI 6 09-MK904

## (undated) DEVICE — SU ETHILON 3-0 PS-1 18" 1663H

## (undated) DEVICE — SOL NACL 0.9% IRRIG 1000ML BOTTLE 2F7124

## (undated) DEVICE — SU VICRYL 3-0 PS-2 18" UND J497G

## (undated) DEVICE — DRSG GAUZE 4X4" 2187

## (undated) DEVICE — ESU GROUND PAD UNIVERSAL W/O CORD

## (undated) DEVICE — DRSG VASELINE 1/2X72" 8884421600

## (undated) DEVICE — SOL WATER IRRIG 1000ML BOTTLE 2F7114

## (undated) DEVICE — Device

## (undated) DEVICE — GLOVE PROTEXIS W/NEU-THERA 6.0  2D73TE60

## (undated) DEVICE — GLOVE PROTEXIS W/NEU-THERA 7.5  2D73TE75

## (undated) DEVICE — POSITIONER ARMBOARD FOAM 1PAIR LF FP-ARMB1

## (undated) DEVICE — NDL 27GA 1.25" 305136

## (undated) DEVICE — STRAP KNEE/BODY 31143004

## (undated) DEVICE — GOWN XLG DISP 9545

## (undated) DEVICE — LINEN TOWEL PACK X5 5464

## (undated) DEVICE — DRSG DERMACARE (OWENS SILK) 3X8" 8886834100

## (undated) DEVICE — SPONGE SURGIFOAM 100 1974

## (undated) DEVICE — TUBE EAR PAPARELLA 2000 TYPE 2 70140256

## (undated) DEVICE — BLADE CLIPPER SGL USE 9680

## (undated) DEVICE — EYE DRSG PAD OVAL

## (undated) DEVICE — DRSG ABDOMINAL PAD UNSTERILE 8X10" WND152764B

## (undated) DEVICE — SUCTION MANIFOLD DORNOCH ULTRA CART UL-CL500

## (undated) DEVICE — GELFILM 12.5 SQ SM 1X2"

## (undated) RX ORDER — FENTANYL CITRATE 50 UG/ML
INJECTION, SOLUTION INTRAMUSCULAR; INTRAVENOUS
Status: DISPENSED
Start: 2019-03-11

## (undated) RX ORDER — PHENYLEPHRINE HCL IN 0.9% NACL 1 MG/10 ML
SYRINGE (ML) INTRAVENOUS
Status: DISPENSED
Start: 2019-03-11

## (undated) RX ORDER — BACITRACIN ZINC 500 [USP'U]/G
OINTMENT TOPICAL
Status: DISPENSED
Start: 2019-03-11

## (undated) RX ORDER — HYDROMORPHONE HYDROCHLORIDE 1 MG/ML
INJECTION, SOLUTION INTRAMUSCULAR; INTRAVENOUS; SUBCUTANEOUS
Status: DISPENSED
Start: 2019-03-11

## (undated) RX ORDER — DEXAMETHASONE SODIUM PHOSPHATE 4 MG/ML
INJECTION, SOLUTION INTRA-ARTICULAR; INTRALESIONAL; INTRAMUSCULAR; INTRAVENOUS; SOFT TISSUE
Status: DISPENSED
Start: 2019-03-11

## (undated) RX ORDER — CEFAZOLIN SODIUM 2 G/100ML
INJECTION, SOLUTION INTRAVENOUS
Status: DISPENSED
Start: 2019-03-11

## (undated) RX ORDER — KETOROLAC TROMETHAMINE 30 MG/ML
INJECTION, SOLUTION INTRAMUSCULAR; INTRAVENOUS
Status: DISPENSED
Start: 2019-03-11

## (undated) RX ORDER — OXYCODONE HYDROCHLORIDE 5 MG/1
TABLET ORAL
Status: DISPENSED
Start: 2019-03-11

## (undated) RX ORDER — ONDANSETRON 2 MG/ML
INJECTION INTRAMUSCULAR; INTRAVENOUS
Status: DISPENSED
Start: 2019-03-11

## (undated) RX ORDER — LIDOCAINE HYDROCHLORIDE 20 MG/ML
INJECTION, SOLUTION EPIDURAL; INFILTRATION; INTRACAUDAL; PERINEURAL
Status: DISPENSED
Start: 2019-03-11

## (undated) RX ORDER — CEFAZOLIN SODIUM 1 G/3ML
INJECTION, POWDER, FOR SOLUTION INTRAMUSCULAR; INTRAVENOUS
Status: DISPENSED
Start: 2019-03-11

## (undated) RX ORDER — PROPOFOL 10 MG/ML
INJECTION, EMULSION INTRAVENOUS
Status: DISPENSED
Start: 2019-03-11